# Patient Record
Sex: FEMALE | Race: ASIAN | Employment: FULL TIME | URBAN - METROPOLITAN AREA
[De-identification: names, ages, dates, MRNs, and addresses within clinical notes are randomized per-mention and may not be internally consistent; named-entity substitution may affect disease eponyms.]

---

## 2017-01-20 PROCEDURE — 88175 CYTOPATH C/V AUTO FLUID REDO: CPT | Performed by: OBSTETRICS & GYNECOLOGY

## 2017-01-20 PROCEDURE — 87624 HPV HI-RISK TYP POOLED RSLT: CPT | Performed by: OBSTETRICS & GYNECOLOGY

## 2018-02-14 PROCEDURE — 87624 HPV HI-RISK TYP POOLED RSLT: CPT | Performed by: OBSTETRICS & GYNECOLOGY

## 2018-02-14 PROCEDURE — 88175 CYTOPATH C/V AUTO FLUID REDO: CPT | Performed by: OBSTETRICS & GYNECOLOGY

## 2019-03-05 PROCEDURE — 87624 HPV HI-RISK TYP POOLED RSLT: CPT | Performed by: OBSTETRICS & GYNECOLOGY

## 2019-03-05 PROCEDURE — 88175 CYTOPATH C/V AUTO FLUID REDO: CPT | Performed by: OBSTETRICS & GYNECOLOGY

## 2021-12-11 ENCOUNTER — OFFICE VISIT (OUTPATIENT)
Dept: FAMILY MEDICINE CLINIC | Facility: CLINIC | Age: 51
End: 2021-12-11
Payer: COMMERCIAL

## 2021-12-11 VITALS
TEMPERATURE: 99 F | SYSTOLIC BLOOD PRESSURE: 134 MMHG | DIASTOLIC BLOOD PRESSURE: 80 MMHG | HEART RATE: 77 BPM | OXYGEN SATURATION: 99 %

## 2021-12-11 DIAGNOSIS — J01.00 ACUTE NON-RECURRENT MAXILLARY SINUSITIS: Primary | ICD-10-CM

## 2021-12-11 PROCEDURE — 99202 OFFICE O/P NEW SF 15 MIN: CPT | Performed by: FAMILY MEDICINE

## 2021-12-11 PROCEDURE — 3075F SYST BP GE 130 - 139MM HG: CPT | Performed by: FAMILY MEDICINE

## 2021-12-11 PROCEDURE — 3079F DIAST BP 80-89 MM HG: CPT | Performed by: FAMILY MEDICINE

## 2021-12-11 RX ORDER — AMOXICILLIN AND CLAVULANATE POTASSIUM 875; 125 MG/1; MG/1
1 TABLET, FILM COATED ORAL 2 TIMES DAILY
Qty: 20 TABLET | Refills: 0 | Status: SHIPPED | OUTPATIENT
Start: 2021-12-11 | End: 2021-12-21

## 2021-12-11 NOTE — PROGRESS NOTES
CHIEF COMPLAINT:   Patient presents with:  Sinus Problem: headache, sinus pressure x 2 weeks. HPI:   Rafiq Stafford is a 54year old female who presents for sinus congestion for  2  weeks after daughter came home with cold sx from college.  Both toya Cancer Father         lymphoma   • No Known Problems Daughter    • Heart Disease Maternal Grandfather    • Diabetes Paternal Uncle    • Stroke Maternal Aunt       Social History    Tobacco Use      Smoking status: Never Smoker      Smokeless tobacco: Never causes of illness, appropriate treatment options and side effects as well as recommendations for symptom management.    Meds & Refills for this Visit:  Requested Prescriptions     Signed Prescriptions Disp Refills   • amoxicillin clavulanate 875-125 MG Oral

## 2021-12-11 NOTE — PATIENT INSTRUCTIONS
Take antibiotics with food and plenty of water. Eat yogurt or take probiotic daily. (Eric Hernandez is a good example of an OTC probiotic)  Make sure to finish the entire antibiotic treatment. Increase fluids and rest.   Use otc meds as needed.   Monitor symptoms

## 2023-03-02 ENCOUNTER — TELEPHONE (OUTPATIENT)
Dept: RADIATION ONCOLOGY | Facility: HOSPITAL | Age: 53
End: 2023-03-02

## 2023-03-02 NOTE — TELEPHONE ENCOUNTER
lvm to [facility] consult . I also, stated we need ref md and dx please when she calls back.

## 2023-03-03 ENCOUNTER — TELEPHONE (OUTPATIENT)
Dept: RADIATION ONCOLOGY | Facility: HOSPITAL | Age: 53
End: 2023-03-03

## 2023-03-03 NOTE — TELEPHONE ENCOUNTER
Patient is calling for an appointment for Radiation, Dx: Breast Ca. Being referred by Dr. Luis Daigle, she would like a call back [facility]. She do not know what Dr coral will be seeing.  Called 3/3/23

## 2023-03-06 ENCOUNTER — TELEPHONE (OUTPATIENT)
Dept: RADIATION ONCOLOGY | Facility: HOSPITAL | Age: 53
End: 2023-03-06

## 2023-03-08 ENCOUNTER — TELEPHONE (OUTPATIENT)
Dept: RADIATION ONCOLOGY | Facility: HOSPITAL | Age: 53
End: 2023-03-08

## 2023-03-10 ENCOUNTER — TELEPHONE (OUTPATIENT)
Dept: HEMATOLOGY/ONCOLOGY | Facility: HOSPITAL | Age: 53
End: 2023-03-10

## 2023-03-10 NOTE — TELEPHONE ENCOUNTER
Patient called to make an appointment for a consult. I told her records have not been received yet. Please call and schedule as soon as records are received. Thank you.

## 2023-03-21 ENCOUNTER — OFFICE VISIT (OUTPATIENT)
Dept: RADIATION ONCOLOGY | Facility: HOSPITAL | Age: 53
End: 2023-03-21
Attending: RADIOLOGY
Payer: COMMERCIAL

## 2023-03-21 VITALS
WEIGHT: 148.19 LBS | RESPIRATION RATE: 18 BRPM | TEMPERATURE: 98 F | OXYGEN SATURATION: 99 % | DIASTOLIC BLOOD PRESSURE: 102 MMHG | HEART RATE: 79 BPM | BODY MASS INDEX: 28 KG/M2 | SYSTOLIC BLOOD PRESSURE: 146 MMHG

## 2023-03-21 DIAGNOSIS — Z17.0 MALIGNANT NEOPLASM OF LOWER-OUTER QUADRANT OF LEFT BREAST OF FEMALE, ESTROGEN RECEPTOR POSITIVE (HCC): Primary | ICD-10-CM

## 2023-03-21 DIAGNOSIS — C50.512 MALIGNANT NEOPLASM OF LOWER-OUTER QUADRANT OF LEFT BREAST OF FEMALE, ESTROGEN RECEPTOR POSITIVE (HCC): Primary | ICD-10-CM

## 2023-03-21 PROCEDURE — 99212 OFFICE O/P EST SF 10 MIN: CPT

## 2023-03-21 NOTE — PATIENT INSTRUCTIONS
Call us once you obtain Oncotype dx results and then we can get you on the schedule for ct simulation for planning of radiation. For any radiation questions call [phone] to speak to an Timmy De Souza.

## 2023-03-22 NOTE — CONSULTS
[facility]    PATIENT'S NAME: Yvette Dumont   RADIATION ONCOLOGIST: Carline Kranthi Vance MD   PATIENT ACCOUNT #: [de-identified] LOCATION: [facility] RECORD #: [id number] YOB: 1970   CONSULTATION DATE: 03/21/2023       RADIATION ONCOLOGY CONSULTATION    REFERRING PHYSICIAN:  Scooter Mann MD     DIAGNOSIS:  Infiltrating ductal carcinoma of the left breast, [id number], ER/AK positive, HER2/veronica negative, Oncotype pending. HISTORY OF PRESENT ILLNESS:  The patient is a 79-year-old female who underwent a routine screening mammogram on 01/09/2023. This revealed an area of asymmetry in the left breast for which additional imaging was recommended. This took place on 01/17/2023 and showed a hypoechoic mass in the 7-o'clock position of the left breast 4 cm from the nipple measuring 0.5 x 0.6 x 0.5 cm. A second lesion was noted within the 5-o'clock position of the left breast as well. Nothing was noted within the axilla. She underwent a biopsy on 01/27/2023, and the pathology from the 5-o'clock lesion came back positive for invasive ductal carcinoma of grade 1. This was noted to be strongly ER/AK positive and HER2/veronica negative with a low Ki-67 score of 8%. The 7-o'clock position biopsy showed only benign findings. She saw Dr. Hunter South and ultimately went to the operating room on 02/15/2023. At that time, she underwent a lumpectomy and sentinel lymph node biopsy. The final pathology showed a 1.2 cm invasive ductal carcinoma of grade 1 with foci of atypical ductal hyperplasia. The margins were noted to be negative by 3 mm. One sentinel lymph node was taken and was uninvolved. An Oncotype score has been sent but is not yet back. The patient has been recovering from surgery and did see Dr. Sanam Murdock, who recommended an Oncotype test as well as the need for tamoxifen.   The patient also was recommended to have radiation therapy and was referred to our department, as she lives close to our facility. The patient otherwise has been recovering well. She denies any particular issues or complaints. She particularly denies any fevers, chills, breast pain, swelling, tenderness, weight loss, changes in appetite, bony or joint pain, or other significant findings. PAST MEDICAL HISTORY:  The patient has a past history of cervical dysplasia and breast cancer as per HPI. PAST SURGICAL HISTORY:  Hysteroscopy, ganglion cyst removal from the left wrist, and wisdom tooth extraction, as well as her recent breast surgery. MEDICATIONS:  None. ALLERGIES:  No known drug allergies. FAMILY HISTORY:  Father with lymphoma. SOCIAL HISTORY:  The patient is a never smoker who reports occasional alcohol use. She is  and has 1 child. She works for [employer] She denies any transportation-related difficulties. REVIEW OF SYSTEMS:  A 14-point review of systems is performed. Pertinent positives and negatives are as per HPI. PHYSICAL EXAMINATION:    GENERAL:  A 42-year-old female who is pleasant, cooperative, and alert, awake, oriented x3. She is in no acute distress. She has an ECOG performance score of 0 and a current pain score of 0. VITAL SIGNS:  Blood pressure of 146/102, pulse of 79, respiratory rate of 18, and a temperature of 97.8. Her weight is 148 pounds. HEENT:  Pupils are equal, round, and reactive to light with accommodation, and the extraocular movements are intact. The oral cavity is without ulcerations or lesions. NECK:  Supple with no lymphadenopathy. LUNGS:  Clear to auscultation bilaterally. HEART:  Regular rate and rhythm, with normal S1 and S2, and no audible murmurs. LYMPHATICS:  There is no supraclavicular, axillary, or inguinal lymphadenopathy. ABDOMEN:  Soft, nontender, nondistended with normoactive bowel sounds and no hepatosplenomegaly. EXTREMITIES:  Without clubbing, cyanosis, or edema.   NEUROLOGIC:  Cranial nerves II through XII are grossly intact. There are no focal deficits. BREASTS:  On examination of the left breast, the patient is recovered well from her recent surgery. There are no palpable masses, skin thickening, or nipple discharge. There is no evidence of infection. The contralateral breast is also clinically negative. IMPRESSION:  This is a 82-year-old female, recently diagnosed with a T1cN0, luminal A left-sided breast cancer. This was 1.2 cm with widely negative margins, and the sentinel lymph nodes were uninvolved. She is hormone receptor positive. An Oncotype score is pending. RECOMMENDATIONS:  As the patient received a lumpectomy, she is a candidate for adjuvant radiation to decrease the likelihood of an in-breast recurrence. To that extent, I would recommend 4256 cGy in 266 cGy daily fractions. Given the negative margins and the lack of any other concerning pathologic finding, I see no particular indication for a boost, and I feel that treatment to the breast alone should be sufficient. Similarly, I would make no effort to wil lymphatics, as her prognostic features are good. I would recommend treating her in the prone position to minimize normal tissue toxicity. With such treatment, I am quite optimistic that we can significantly decrease her likelihood for failure, while causing only minimal morbidity. I then had a long talk with the patient regarding the potential side effects of her treatment. I do believe she should tolerate treatment well, but there are a number of issues that can occur. Specifically, she will have fatigue which will worsen during the course of therapy. She will also have a skin reaction akin to a sunburn. This includes redness, itching, peeling, dryness, and blistering. All these side effects will reach their zenith by the end of therapy and should persist for about 2 weeks thereafter.   At that point, she will slowly recover, and I would not expect any long-term or permanent side effects as a result of her treatment. Given modern radiotherapeutic techniques and prone positioning, I am quite optimistic that we should be able to spare any pulmonary or cardiac toxicity, though this is never completely without some risk. The breast itself can have some soft tissue injury or skin irritation as well, though the likelihood of this is also small. Following our long and thorough discussion of all the risks and benefits of treatment, the patient indicated that she understood all these issues and does wish to proceed with treatment as I previously dictated. Thank you very much for allowing me the opportunity to participate in the care of this patient. If there should be any questions regarding the radiotherapy, please feel free to contact me at any time. Dictated By Zahira Avelar MD  d: 03/21/2023 15:39:06  t: 03/21/2023 16:14:33  Job [id number]  XZC/    cc: Dr. Ted García.  MD Dr. Viktor Mclaughlin

## 2023-03-29 ENCOUNTER — HOSPITAL ENCOUNTER (OUTPATIENT)
Dept: RADIATION ONCOLOGY | Facility: HOSPITAL | Age: 53
Discharge: HOME | End: 2023-03-29
Attending: RADIOLOGY
Payer: COMMERCIAL

## 2023-03-29 PROCEDURE — 77333 RADIATION TREATMENT AID(S): CPT | Performed by: RADIOLOGY

## 2023-03-29 PROCEDURE — 77290 THER RAD SIMULAJ FIELD CPLX: CPT | Performed by: RADIOLOGY

## 2023-04-01 ENCOUNTER — HOSPITAL ENCOUNTER (OUTPATIENT)
Dept: RADIATION ONCOLOGY | Facility: HOSPITAL | Age: 53
Discharge: HOME | End: 2023-04-01
Attending: RADIOLOGY
Payer: COMMERCIAL

## 2023-04-10 ENCOUNTER — HOSPITAL ENCOUNTER (OUTPATIENT)
Dept: RADIATION ONCOLOGY | Facility: HOSPITAL | Age: 53
End: 2023-04-10
Attending: RADIOLOGY
Payer: COMMERCIAL

## 2023-04-10 ENCOUNTER — HOSPITAL ENCOUNTER (OUTPATIENT)
Dept: RADIATION ONCOLOGY | Facility: HOSPITAL | Age: 53
Discharge: HOME | End: 2023-04-10
Attending: RADIOLOGY
Payer: COMMERCIAL

## 2023-04-10 DIAGNOSIS — C50.512 MALIGNANT NEOPLASM OF LOWER-OUTER QUADRANT OF LEFT BREAST OF FEMALE, ESTROGEN RECEPTOR POSITIVE (HCC): Primary | ICD-10-CM

## 2023-04-10 DIAGNOSIS — Z17.0 MALIGNANT NEOPLASM OF LOWER-OUTER QUADRANT OF LEFT BREAST OF FEMALE, ESTROGEN RECEPTOR POSITIVE (HCC): Primary | ICD-10-CM

## 2023-04-10 PROCEDURE — 77412 RADIATION TX DELIVERY LVL 3: CPT | Performed by: RADIOLOGY

## 2023-04-10 PROCEDURE — 77280 THER RAD SIMULAJ FIELD SMPL: CPT | Performed by: RADIOLOGY

## 2023-04-11 PROCEDURE — 77412 RADIATION TX DELIVERY LVL 3: CPT | Performed by: RADIOLOGY

## 2023-04-11 PROCEDURE — 77290 THER RAD SIMULAJ FIELD CPLX: CPT | Performed by: RADIOLOGY

## 2023-04-12 PROCEDURE — 77387 GUIDANCE FOR RADJ TX DLVR: CPT | Performed by: RADIOLOGY

## 2023-04-12 PROCEDURE — 77412 RADIATION TX DELIVERY LVL 3: CPT | Performed by: RADIOLOGY

## 2023-04-13 PROCEDURE — 77412 RADIATION TX DELIVERY LVL 3: CPT | Performed by: RADIOLOGY

## 2023-04-13 PROCEDURE — 77387 GUIDANCE FOR RADJ TX DLVR: CPT | Performed by: RADIOLOGY

## 2023-04-14 PROCEDURE — 77412 RADIATION TX DELIVERY LVL 3: CPT | Performed by: RADIOLOGY

## 2023-04-14 PROCEDURE — 77387 GUIDANCE FOR RADJ TX DLVR: CPT | Performed by: RADIOLOGY

## 2023-04-14 PROCEDURE — 77336 RADIATION PHYSICS CONSULT: CPT | Performed by: RADIOLOGY

## 2023-04-17 ENCOUNTER — HOSPITAL ENCOUNTER (OUTPATIENT)
Dept: RADIATION ONCOLOGY | Facility: HOSPITAL | Age: 53
End: 2023-04-17
Attending: RADIOLOGY
Payer: COMMERCIAL

## 2023-04-17 VITALS
DIASTOLIC BLOOD PRESSURE: 88 MMHG | SYSTOLIC BLOOD PRESSURE: 121 MMHG | RESPIRATION RATE: 16 BRPM | OXYGEN SATURATION: 98 % | TEMPERATURE: 99 F | HEART RATE: 74 BPM

## 2023-04-17 DIAGNOSIS — C50.512 MALIGNANT NEOPLASM OF LOWER-OUTER QUADRANT OF LEFT BREAST OF FEMALE, ESTROGEN RECEPTOR POSITIVE (HCC): Primary | ICD-10-CM

## 2023-04-17 DIAGNOSIS — Z17.0 MALIGNANT NEOPLASM OF LOWER-OUTER QUADRANT OF LEFT BREAST OF FEMALE, ESTROGEN RECEPTOR POSITIVE (HCC): Primary | ICD-10-CM

## 2023-04-17 PROCEDURE — 77412 RADIATION TX DELIVERY LVL 3: CPT | Performed by: RADIOLOGY

## 2023-04-17 PROCEDURE — 77387 GUIDANCE FOR RADJ TX DLVR: CPT | Performed by: RADIOLOGY

## 2023-04-18 PROCEDURE — 77412 RADIATION TX DELIVERY LVL 3: CPT | Performed by: RADIOLOGY

## 2023-04-18 PROCEDURE — 77387 GUIDANCE FOR RADJ TX DLVR: CPT | Performed by: RADIOLOGY

## 2023-04-19 PROCEDURE — 77387 GUIDANCE FOR RADJ TX DLVR: CPT | Performed by: RADIOLOGY

## 2023-04-19 PROCEDURE — 77412 RADIATION TX DELIVERY LVL 3: CPT | Performed by: RADIOLOGY

## 2023-04-20 PROCEDURE — 77412 RADIATION TX DELIVERY LVL 3: CPT | Performed by: RADIOLOGY

## 2023-04-20 PROCEDURE — 77387 GUIDANCE FOR RADJ TX DLVR: CPT | Performed by: RADIOLOGY

## 2023-04-21 PROCEDURE — 77387 GUIDANCE FOR RADJ TX DLVR: CPT | Performed by: RADIOLOGY

## 2023-04-21 PROCEDURE — 77412 RADIATION TX DELIVERY LVL 3: CPT | Performed by: RADIOLOGY

## 2023-04-21 PROCEDURE — 77336 RADIATION PHYSICS CONSULT: CPT | Performed by: RADIOLOGY

## 2023-04-24 ENCOUNTER — HOSPITAL ENCOUNTER (OUTPATIENT)
Dept: RADIATION ONCOLOGY | Facility: HOSPITAL | Age: 53
End: 2023-04-24
Attending: RADIOLOGY
Payer: COMMERCIAL

## 2023-04-24 VITALS
TEMPERATURE: 98 F | OXYGEN SATURATION: 98 % | SYSTOLIC BLOOD PRESSURE: 128 MMHG | DIASTOLIC BLOOD PRESSURE: 88 MMHG | HEART RATE: 78 BPM | RESPIRATION RATE: 16 BRPM

## 2023-04-24 DIAGNOSIS — C50.512 MALIGNANT NEOPLASM OF LOWER-OUTER QUADRANT OF LEFT BREAST OF FEMALE, ESTROGEN RECEPTOR POSITIVE (HCC): Primary | ICD-10-CM

## 2023-04-24 DIAGNOSIS — Z17.0 MALIGNANT NEOPLASM OF LOWER-OUTER QUADRANT OF LEFT BREAST OF FEMALE, ESTROGEN RECEPTOR POSITIVE (HCC): Primary | ICD-10-CM

## 2023-04-24 PROCEDURE — 77387 GUIDANCE FOR RADJ TX DLVR: CPT | Performed by: RADIOLOGY

## 2023-04-24 PROCEDURE — 77412 RADIATION TX DELIVERY LVL 3: CPT | Performed by: RADIOLOGY

## 2023-04-25 PROCEDURE — 77387 GUIDANCE FOR RADJ TX DLVR: CPT | Performed by: RADIOLOGY

## 2023-04-25 PROCEDURE — 77412 RADIATION TX DELIVERY LVL 3: CPT | Performed by: RADIOLOGY

## 2023-04-26 PROCEDURE — 77412 RADIATION TX DELIVERY LVL 3: CPT | Performed by: RADIOLOGY

## 2023-04-26 PROCEDURE — 77387 GUIDANCE FOR RADJ TX DLVR: CPT | Performed by: RADIOLOGY

## 2023-04-27 PROCEDURE — 77387 GUIDANCE FOR RADJ TX DLVR: CPT | Performed by: RADIOLOGY

## 2023-04-27 PROCEDURE — 77412 RADIATION TX DELIVERY LVL 3: CPT | Performed by: RADIOLOGY

## 2023-04-28 PROCEDURE — 77336 RADIATION PHYSICS CONSULT: CPT | Performed by: RADIOLOGY

## 2023-04-28 PROCEDURE — 77387 GUIDANCE FOR RADJ TX DLVR: CPT | Performed by: RADIOLOGY

## 2023-04-28 PROCEDURE — 77412 RADIATION TX DELIVERY LVL 3: CPT | Performed by: RADIOLOGY

## 2023-05-01 ENCOUNTER — HOSPITAL ENCOUNTER (OUTPATIENT)
Dept: RADIATION ONCOLOGY | Facility: HOSPITAL | Age: 53
Discharge: HOME | End: 2023-05-01
Attending: RADIOLOGY
Payer: COMMERCIAL

## 2023-05-01 ENCOUNTER — HOSPITAL ENCOUNTER (OUTPATIENT)
Dept: RADIATION ONCOLOGY | Facility: HOSPITAL | Age: 53
End: 2023-05-01
Attending: RADIOLOGY
Payer: COMMERCIAL

## 2023-05-01 VITALS
SYSTOLIC BLOOD PRESSURE: 125 MMHG | OXYGEN SATURATION: 97 % | RESPIRATION RATE: 16 BRPM | TEMPERATURE: 99 F | DIASTOLIC BLOOD PRESSURE: 88 MMHG | HEART RATE: 89 BPM

## 2023-05-01 DIAGNOSIS — C50.512 MALIGNANT NEOPLASM OF LOWER-OUTER QUADRANT OF LEFT BREAST OF FEMALE, ESTROGEN RECEPTOR POSITIVE (HCC): Primary | ICD-10-CM

## 2023-05-01 DIAGNOSIS — Z17.0 MALIGNANT NEOPLASM OF LOWER-OUTER QUADRANT OF LEFT BREAST OF FEMALE, ESTROGEN RECEPTOR POSITIVE (HCC): Primary | ICD-10-CM

## 2023-08-04 ENCOUNTER — TELEPHONE (OUTPATIENT)
Dept: RADIATION ONCOLOGY | Facility: HOSPITAL | Age: 53
End: 2023-08-04

## 2023-08-30 ENCOUNTER — APPOINTMENT (OUTPATIENT)
Dept: RADIATION ONCOLOGY | Facility: HOSPITAL | Age: 53
End: 2023-08-30
Attending: RADIOLOGY
Payer: COMMERCIAL

## 2023-09-06 ENCOUNTER — HOSPITAL ENCOUNTER (OUTPATIENT)
Dept: RADIATION ONCOLOGY | Facility: HOSPITAL | Age: 53
Discharge: HOME | End: 2023-09-06
Attending: RADIOLOGY
Payer: COMMERCIAL

## 2023-09-06 VITALS
DIASTOLIC BLOOD PRESSURE: 96 MMHG | OXYGEN SATURATION: 99 % | RESPIRATION RATE: 18 BRPM | SYSTOLIC BLOOD PRESSURE: 138 MMHG | WEIGHT: 147.19 LBS | HEART RATE: 96 BPM | TEMPERATURE: 98 F | BODY MASS INDEX: 27 KG/M2

## 2023-09-06 DIAGNOSIS — Z17.0 MALIGNANT NEOPLASM OF LOWER-OUTER QUADRANT OF LEFT BREAST OF FEMALE, ESTROGEN RECEPTOR POSITIVE: Primary | ICD-10-CM

## 2023-09-06 DIAGNOSIS — C50.512 MALIGNANT NEOPLASM OF LOWER-OUTER QUADRANT OF LEFT BREAST OF FEMALE, ESTROGEN RECEPTOR POSITIVE: Primary | ICD-10-CM

## 2023-09-06 PROCEDURE — 99213 OFFICE O/P EST LOW 20 MIN: CPT

## 2023-09-06 RX ORDER — TAMOXIFEN CITRATE 20 MG/1
20 TABLET ORAL DAILY
Qty: 30 TABLET | Refills: 2 | COMMUNITY
Start: 2023-09-01 | End: 2023-11-30

## 2023-09-06 NOTE — PROGRESS NOTES
[facility]    PATIENT'S NAME: Cyndee Inman   RADIATION ONCOLOGIST: Candi Pineda. Brittany Dubose M.D. PATIENT ACCOUNT #: [de-identified] David Anderson   [facility]   MEDICAL RECORD #: [id number] YOB: 1970   FOLLOW-UP DATE: 09/06/2023       RADIATION ONCOLOGY FOLLOW-UP NOTE    REFERRING PHYSICIAN:  Nevin Bahena MD.    DIAGNOSIS:  Infiltrating ductal carcinoma of the left breast, T1cN0, ER/KS positive, HER2/veronica negative, Oncotype 16. REGION TREATED:  Left breast, 4256 cGy, completed 05/01/2023. INTERVAL SINCE COMPLETION OF RADIATION THERAPY:  Four months. PATIENT STATUS:  Clinically and mammographically ODALIS, on endocrine therapy. HISTORY:  The patient is a 37-year-old female who presents today for a routine followup visit. She has a history of an early-stage left-sided breast cancer. She had undergone routine screening mammography on 01/09/2023, which showed an area of asymmetry in the left breast.  Additional images showed a hypoechoic mass in the 7 o'clock position and a second lesion was noted in the 5 o'clock position as well. The biopsy from the 5 o'clock position came back positive for invasive ductal carcinoma of grade 1. The 7 o'clock lesion showed only benign findings. The tumor was strongly ER/KS positive and HER2/veronica negative with a low Ki-67 score. She went for a lumpectomy on 02/15/2023, and sentinel lymph node biopsy. Final pathology showed a 1.2 cm invasive ductal carcinoma of grade 1 with foci of atypical ductal hyperplasia. Margins were widely negative and 1 sentinel lymph node was clean. Her Oncotype score came back at 16. Medical Oncology did not recommend chemotherapy, but did recommend endocrine therapy and referred her to Radiation. I ultimately treated the left breast to 4256 cGy with no boost, and she completed these treatments on 05/01/2023. The patient presents today for her first followup visit since completing her radiation.   Overall, she is doing quite well.  She denies any continued problems or issues as a result of the radiation. She does have some swelling in the breast from time to time and gets some twinges of discomfort periodically, but these are minor and appear to be improving. She has been on tamoxifen for a few months and appears to be tolerating this extremely well with no hot flashes or joint pains or other difficulties. Her most recent mammogram was on 08/04/2023, and was noted to be negative for any suspicious findings. PHYSICAL EXAMINATION:    VITAL SIGNS:  On exam today, the patient is noted to have a blood pressure of 138/96, pulse of 96, respiratory rate of 18, and temperature of 97.5. She has a pain score of 0 and a weight of 147 pounds. HEENT:  Pupils are equal, round, reactive to light with accommodation, and the extraocular movements are intact. The oral cavity is without ulcerations or lesions. NECK:  Supple with no lymphadenopathy. LUNGS:  Clear to auscultation bilaterally. HEART:  Regular rate and rhythm, with normal S1, S2, and no audible murmurs. LYMPHATICS:  There is no supraclavicular, axillary, or inguinal lymphadenopathy. ABDOMEN:  Benign. BREASTS:  On examination of the left breast, the patient has some faint hyperpigmentation but otherwise has healed well. There is some swelling which is noted throughout, particularly on the medial aspect. There are no palpable masses, skin thickening, or nipple discharge. The contralateral breast is also clinically negative. IMPRESSION AND RECOMMENDATIONS:  Overall, the patient is doing extremely well. She has recovered very nicely from the acute side effects of radiotherapy and is tolerating the endocrine therapy beautifully. I remain very optimistic regarding her prognosis. She does have some persistent swelling and I told her that this should improve though it may take many months to do so.   As for the persistent twinges, this is commonplace after surgery and radiation, and I told her that this should also improve in the future as the nerves recover. In light of the fact that she continues to follow with Medical Oncology and has been getting her mammograms elsewhere, I told her that she really does not need to continue to see me for routine visits. I would be happy to see her again if a need should arise, but I otherwise remain very optimistic regarding her prognosis. Thank you very much for allowing me the opportunity to participate in the care of this patient. If there should be any questions regarding the radiotherapy, please feel free to contact me at any time. Dictated By Artice Sacks Rema Silvan, M.D.  d: 09/06/2023 11:40:53  t: 09/06/2023 16:45:15  Job [id number]  BATSHEVA/    cc: Val Junior. MD Christine Brown M.D.    Claudy Tyler M.D.

## 2023-09-06 NOTE — PATIENT INSTRUCTIONS
-FOLLOW-UP APPOINTMENT WITH DR. NADIA [place] ONLY IF NEEDED    - CALL [phone] IF YOU HAVE ANY QUESTIONS/CONCERNS REGARDING RADIATION THERAPY